# Patient Record
Sex: FEMALE | Race: BLACK OR AFRICAN AMERICAN | NOT HISPANIC OR LATINO | URBAN - METROPOLITAN AREA
[De-identification: names, ages, dates, MRNs, and addresses within clinical notes are randomized per-mention and may not be internally consistent; named-entity substitution may affect disease eponyms.]

---

## 2017-05-03 ENCOUNTER — EMERGENCY (EMERGENCY)
Facility: HOSPITAL | Age: 62
LOS: 1 days | Discharge: HOME | End: 2017-05-03
Attending: STUDENT IN AN ORGANIZED HEALTH CARE EDUCATION/TRAINING PROGRAM | Admitting: STUDENT IN AN ORGANIZED HEALTH CARE EDUCATION/TRAINING PROGRAM
Payer: COMMERCIAL

## 2017-05-03 VITALS
RESPIRATION RATE: 17 BRPM | OXYGEN SATURATION: 98 % | DIASTOLIC BLOOD PRESSURE: 57 MMHG | SYSTOLIC BLOOD PRESSURE: 105 MMHG | TEMPERATURE: 98 F

## 2017-05-03 VITALS
RESPIRATION RATE: 20 BRPM | HEART RATE: 75 BPM | WEIGHT: 162.04 LBS | TEMPERATURE: 98 F | HEIGHT: 62 IN | SYSTOLIC BLOOD PRESSURE: 135 MMHG | OXYGEN SATURATION: 100 % | DIASTOLIC BLOOD PRESSURE: 86 MMHG

## 2017-05-03 DIAGNOSIS — I10 ESSENTIAL (PRIMARY) HYPERTENSION: ICD-10-CM

## 2017-05-03 DIAGNOSIS — Z90.49 ACQUIRED ABSENCE OF OTHER SPECIFIED PARTS OF DIGESTIVE TRACT: Chronic | ICD-10-CM

## 2017-05-03 DIAGNOSIS — M54.9 DORSALGIA, UNSPECIFIED: ICD-10-CM

## 2017-05-03 DIAGNOSIS — E66.9 OBESITY, UNSPECIFIED: ICD-10-CM

## 2017-05-03 DIAGNOSIS — E78.5 HYPERLIPIDEMIA, UNSPECIFIED: ICD-10-CM

## 2017-05-03 DIAGNOSIS — R07.89 OTHER CHEST PAIN: ICD-10-CM

## 2017-05-03 PROCEDURE — 99285 EMERGENCY DEPT VISIT HI MDM: CPT

## 2017-05-03 PROCEDURE — 71046 X-RAY EXAM CHEST 2 VIEWS: CPT

## 2017-05-03 PROCEDURE — 96374 THER/PROPH/DIAG INJ IV PUSH: CPT

## 2017-05-03 PROCEDURE — 99284 EMERGENCY DEPT VISIT MOD MDM: CPT | Mod: 25

## 2017-05-03 PROCEDURE — 83690 ASSAY OF LIPASE: CPT

## 2017-05-03 PROCEDURE — 82150 ASSAY OF AMYLASE: CPT

## 2017-05-03 PROCEDURE — 85610 PROTHROMBIN TIME: CPT

## 2017-05-03 PROCEDURE — 84484 ASSAY OF TROPONIN QUANT: CPT

## 2017-05-03 PROCEDURE — 93005 ELECTROCARDIOGRAM TRACING: CPT

## 2017-05-03 PROCEDURE — 71020: CPT | Mod: 26

## 2017-05-03 PROCEDURE — 82553 CREATINE MB FRACTION: CPT

## 2017-05-03 PROCEDURE — 82550 ASSAY OF CK (CPK): CPT

## 2017-05-03 PROCEDURE — 85027 COMPLETE CBC AUTOMATED: CPT

## 2017-05-03 PROCEDURE — 80053 COMPREHEN METABOLIC PANEL: CPT

## 2017-05-03 RX ORDER — KETOROLAC TROMETHAMINE 30 MG/ML
30 SYRINGE (ML) INJECTION ONCE
Qty: 0 | Refills: 0 | Status: DISCONTINUED | OUTPATIENT
Start: 2017-05-03 | End: 2017-05-03

## 2017-05-03 RX ORDER — SIMVASTATIN 20 MG/1
1 TABLET, FILM COATED ORAL
Qty: 0 | Refills: 0 | COMMUNITY

## 2017-05-03 RX ORDER — ASPIRIN/CALCIUM CARB/MAGNESIUM 324 MG
325 TABLET ORAL ONCE
Qty: 0 | Refills: 0 | Status: COMPLETED | OUTPATIENT
Start: 2017-05-03 | End: 2017-05-03

## 2017-05-03 RX ORDER — LISINOPRIL 2.5 MG/1
1 TABLET ORAL
Qty: 0 | Refills: 0 | COMMUNITY

## 2017-05-03 RX ADMIN — Medication 325 MILLIGRAM(S): at 08:04

## 2017-05-03 RX ADMIN — Medication 30 MILLIGRAM(S): at 05:03

## 2017-05-03 RX ADMIN — Medication 30 MILLIGRAM(S): at 08:00

## 2017-05-03 NOTE — ED PROVIDER NOTE - PROGRESS NOTE DETAILS
Patient seen by Dr. Cardenas. No need to obtain second set CE. He has set patient up for outpatient echo and stress and recommends ASA 81mg daily. Patient expressed understanding, stable for discharge. Copies of all reports given

## 2017-05-03 NOTE — ED PROVIDER NOTE - OBJECTIVE STATEMENT
62 year old female with a 62 year old female with a history of HTN, high cholesterol presents with left-sided chest pain, back and b/l side pain since yesterday morning. Patient states muscular pain, worse with movement. 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 Patient took 2 Aleve and 3 Motrin without relief. No change with exertion. Denies n/v/SOB/diaphoresis. No history of DM, smoking, no family history of heart disease. Patient had a normal stress test 3 yers ago. PMD Analy Cabello.

## 2017-05-03 NOTE — ED ADULT NURSE NOTE - OBJECTIVE STATEMENT
c/o chest pain/back pain/ x today.  pain is reproducable with movement and breathing.  labs drawn and sent.

## 2017-05-03 NOTE — ED PROVIDER NOTE - NSCAREINITIATED _GEN_ER
Dr. Alonzo at bedside in PACU. Aware of increased BP. Will order metoprolol.   Suzi Diego(Attending)

## 2017-05-03 NOTE — CONSULT NOTE ADULT - SUBJECTIVE AND OBJECTIVE BOX
[facility] - Blossom Adkins, Brenda Galo Pannella, Patel    Initial Consult Note    CHIEF COMPLAINT: Patient is a 62y old  Female who presents with a chief complaint of chest pain    HPI:  This is a 62 year old woman with a history of obesity, hypertension, hyperlipidemia who presents to the ED with complaints of chest pain.  She reports that she was awoken by a left and right sided chest pain yesterday AM. There was no radiation, and no associated dyspnea, nausea, vomiting, or diaphoresis.  She went to work, but the pain persisted all day, and she came to the ED for evaluation.  She is pain free at the time of my exam.  This has not happened to her before. The pain was not associated with food, and was not exacerbated by exertion.  She denies PND, orthopnea, LE swelling, dizziness, or syncope.  She has never had a cardiac evaluation.      PAST MEDICAL & SURGICAL HISTORY:  High cholesterol  HTN (hypertension)  History of cholecystectomy      SOCIAL HISTORY:  No tobacco, ethanol, or drug abuse.    FAMILY HISTORY:    No family history of acute MI or sudden cardiac death.    MEDICATIONS  (STANDING):  Lisinopril and atorvastatin.  Unknown doses      Allergies    No Known Allergies        REVIEW OF SYSTEMS:    CONSTITUTIONAL: No weakness, fevers or chills  EYES/ENT: No visual changes;  No vertigo or throat pain   NECK: No pain or stiffness  RESPIRATORY: No cough, wheezing, hemoptysis; No shortness of breath  CARDIOVASCULAR: No chest pain or palpitations  GASTROINTESTINAL: No abdominal pain. No nausea, vomiting, or hematemesis; No diarrhea or constipation. No melena or hematochezia.  GENITOURINARY: No dysuria, frequency or hematuria  NEUROLOGICAL: No numbness or weakness  SKIN: No itching or rash  All other review of systems is negative unless indicated above    VITAL SIGNS:   Vital Signs Last 24 Hrs  T(C): 36.7, Max: 36.7 (05-03 @ 02:32)  T(F): 98.1, Max: 98.1 (05-03 @ 05:08)  HR: 63 (63 - 75)  BP: 121/54 (121/54 - 135/86)  BP(mean): --  RR: 18 (18 - 20)  SpO2: 99% (99% - 100%)    I&O's Summary      On Exam:    Constitutional: NAD, alert and oriented x 3  Lungs:  Non-labored, breath sounds are clear bilaterally, No wheezing, rales or rhonchi  Cardiovascular: RRR.  S1 and S2 positive.  No rubs, gallops or clicks.  2/6 systolic murmur  Gastrointestinal: Bowel Sounds present, soft, nontender.   Lymph: No peripheral edema. No cervical lymphadenopathy.  Neurological: Alert, no focal deficits  Skin: No rashes or ulcers   Psych:  Mood & affect appropriate.    LABS: All Labs Reviewed:                        12.1   8.4   )-----------( 265      ( 03 May 2017 03:12 )             36.4     03 May 2017 03:12    139    |  103    |  16     ----------------------------<  124    4.2     |  26     |  0.79     Ca    9.7        03 May 2017 03:12    TPro  8.0    /  Alb  4.2    /  TBili  0.3    /  DBili  x      /  AST  35     /  ALT  34     /  AlkPhos  92     03 May 2017 03:12    PT/INR - ( 03 May 2017 03:12 )   PT: 10.9 sec;   INR: 1.00 ratio           CARDIAC MARKERS ( 03 May 2017 03:12 )  <.015 ng/mL / x     / 193 U/L / x     / 1.2 ng/mL        RADIOLOGY:    [facility] - Blossom Adkins, Clover, Brenda, Mik Aguirre    Initial Consult Note    CHIEF COMPLAINT: Patient is a 62y old  Female who presents with a chief complaint of     HPI:      PAST MEDICAL & SURGICAL HISTORY:  High cholesterol  HTN (hypertension)  History of cholecystectomy      SOCIAL HISTORY:  No tobacco, ethanol, or drug abuse.    FAMILY HISTORY:    No family history of acute MI or sudden cardiac death.    MEDICATIONS  (STANDING):    MEDICATIONS  (PRN):      Allergies    No Known Allergies    Intolerances        REVIEW OF SYSTEMS:    CONSTITUTIONAL: No weakness, fevers or chills  EYES/ENT: No visual changes;  No vertigo or throat pain   NECK: No pain or stiffness  RESPIRATORY: No cough, wheezing, hemoptysis; No shortness of breath  CARDIOVASCULAR: No chest pain or palpitations  GASTROINTESTINAL: No abdominal pain. No nausea, vomiting, or hematemesis; No diarrhea or constipation. No melena or hematochezia.  GENITOURINARY: No dysuria, frequency or hematuria  NEUROLOGICAL: No numbness or weakness  SKIN: No itching or rash  All other review of systems is negative unless indicated above    VITAL SIGNS:   Vital Signs Last 24 Hrs  T(C): 36.7, Max: 36.7 (05-03 @ 02:32)  T(F): 98.1, Max: 98.1 (05-03 @ 05:08)  HR: 63 (63 - 75)  BP: 121/54 (121/54 - 135/86)  BP(mean): --  RR: 18 (18 - 20)  SpO2: 99% (99% - 100%)    I&O's Summary      On Exam:    Constitutional: NAD, alert and oriented x 3  Lungs:  Non-labored, breath sounds are clear bilaterally, No wheezing, rales or rhonchi  Cardiovascular: RRR.  S1 and S2 positive.  No murmurs, rubs, gallops or clicks  Gastrointestinal: Bowel Sounds present, soft, nontender.   Lymph: No peripheral edema. No cervical lymphadenopathy.  Neurological: Alert, no focal deficits  Skin: No rashes or ulcers   Psych:  Mood & affect appropriate.    LABS: All Labs Reviewed:                        12.1   8.4   )-----------( 265      ( 03 May 2017 03:12 )             36.4     03 May 2017 03:12    139    |  103    |  16     ----------------------------<  124    4.2     |  26     |  0.79     Ca    9.7        03 May 2017 03:12    TPro  8.0    /  Alb  4.2    /  TBili  0.3    /  DBili  x      /  AST  35     /  ALT  34     /  AlkPhos  92     03 May 2017 03:12    PT/INR - ( 03 May 2017 03:12 )   PT: 10.9 sec;   INR: 1.00 ratio           CARDIAC MARKERS ( 03 May 2017 03:12 )  <.015 ng/mL / x     / 193 U/L / x     / 1.2 ng/mL      Blood Culture:         RADIOLOGY:  CXR negative    EKG:  Sinus rhythm with anterolateral TWI    Assessment/Plan:  62y Female with above history with atypical chest pain.  Her EKG has T wave inversions consistent with lateral ischemia.  I do not think she is having an acute coronary syndrome.  She can be discharged from the ED, and will be started on aspirin 81 QD.  She was given the contact information for my office to make an appointment for an echocardiogram to assess her cardiac structure and function, as well as a nuclear stress test to assess for ischemic heart disease.  She knows to call the office today for an appt.  She will return to the ED if the symptoms worsen.  She will continue her ACEi and statin at her outpatient doses.           EKG:    Assessment/Plan:  62y Female

## 2017-05-03 NOTE — ED ADULT NURSE NOTE - ED STAT RN HANDOFF DETAILS
Report received from Milan EVANS pt status and medications reviewed awaiting repeat cardiac enzymes and cardiology consult.

## 2020-02-10 NOTE — ED ADULT TRIAGE NOTE - NS ED NURSE DIRECT TO ROOM YN
Still requiring oxygen at 4 L.  Will wean off to keep sats above 92% if tolerated.  We will have pulmonology evaluate the patient.   No

## 2023-03-08 PROBLEM — E78.00 PURE HYPERCHOLESTEROLEMIA, UNSPECIFIED: Chronic | Status: ACTIVE | Noted: 2017-05-03

## 2023-03-08 PROBLEM — I10 ESSENTIAL (PRIMARY) HYPERTENSION: Chronic | Status: ACTIVE | Noted: 2017-05-03

## 2023-03-09 PROBLEM — Z00.00 ENCOUNTER FOR PREVENTIVE HEALTH EXAMINATION: Status: ACTIVE | Noted: 2023-03-09

## 2023-03-13 ENCOUNTER — NON-APPOINTMENT (OUTPATIENT)
Age: 68
End: 2023-03-13

## 2023-03-13 ENCOUNTER — APPOINTMENT (OUTPATIENT)
Dept: ORTHOPEDIC SURGERY | Facility: CLINIC | Age: 68
End: 2023-03-13
Payer: OTHER MISCELLANEOUS

## 2023-03-13 ENCOUNTER — FORM ENCOUNTER (OUTPATIENT)
Age: 68
End: 2023-03-13

## 2023-03-13 VITALS — BODY MASS INDEX: 30.36 KG/M2 | HEIGHT: 62 IN | WEIGHT: 165 LBS

## 2023-03-13 DIAGNOSIS — M79.18 MYALGIA, OTHER SITE: ICD-10-CM

## 2023-03-13 DIAGNOSIS — E78.00 PURE HYPERCHOLESTEROLEMIA, UNSPECIFIED: ICD-10-CM

## 2023-03-13 DIAGNOSIS — I10 ESSENTIAL (PRIMARY) HYPERTENSION: ICD-10-CM

## 2023-03-13 PROCEDURE — 99072 ADDL SUPL MATRL&STAF TM PHE: CPT

## 2023-03-13 PROCEDURE — 73010 X-RAY EXAM OF SHOULDER BLADE: CPT | Mod: LT

## 2023-03-13 PROCEDURE — 73030 X-RAY EXAM OF SHOULDER: CPT | Mod: LT

## 2023-03-13 PROCEDURE — 99204 OFFICE O/P NEW MOD 45 MIN: CPT

## 2023-03-13 RX ORDER — NAPROXEN 500 MG/1
500 TABLET ORAL TWICE DAILY
Qty: 60 | Refills: 0 | Status: ACTIVE | COMMUNITY
Start: 2023-03-13 | End: 1900-01-01

## 2023-03-13 NOTE — WORK
[Partial] : partial [Does not reveal pre-existing condition(s) that may affect treatment/prognosis] : does not reveal pre-existing condition(s) that may affect treatment/prognosis [Can return to work with limitations on ______] : can return to work with limitations on [unfilled] [Patient] : patient [I provided the services listed above] :  I provided the services listed above.

## 2023-03-13 NOTE — HISTORY OF PRESENT ILLNESS
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

## 2023-03-13 NOTE — ASSESSMENT
[FreeTextEntry1] :  Left X-Ray Examination of the SHOULDER 2 views:  quest GT avulsion\par X-Ray Examination of the SCAPULA 1 or 2 views shows: there is an acromial spur\par \par given their traumatic injury along with weakness on exam and positive nathan testing, we will obtain an MRI to evaluate the integrity of the rotator cuff tissue and possible need for surgery\par \par - The patient was advised of the diagnosis.  The natural history of the pathology was explained to the patient in layman's terms.  Several different treatment options were discussed and explained including the risks and benefits of both surgical and non-surgical treatments. \par - Follow up after MRI to discuss results and further discuss treatment options.\par - Patient was given a prescription for an anti-inflammatory medication.  They will take it for the next week and then on an as needed basis, as long as there are no medical contra-indications.  Patient is counseled on possible GI, renal, and cardiovascular side effects.\par - Napryn rx\par - In the meantime, the patient was advised to apply ice to the area daily, use anti-inflammatory medication, and let pain guide their activities.\par

## 2023-03-13 NOTE — IMAGING
[de-identified] : \par LEFT SHOULDER\par Inspection: No swelling. \par Palpation: Tenderness is noted at the bicipital groove, anterior and lateral. \par Range of motion: There is pain with range of motion.\par , ER 55, @90ER 90, @90IR 30\par Strength: There is pain, weakness, and discomfort with strength testing.\par Forward Flexion 3/5. Abduction 3/5.  External Rotation 4/5 and Internal Rotation 5-/5 \par Neurological testings: motor and sensor intact distally.\par Ligament Stability and Special Tests: \par There is positive arc of pain. \par Shoulder apprehension: neg\par Shoulder relocation: neg\par Freeman’s test: pos\par Biceps Active test: neg\par Jiménez Labral Shear: neg\par Impingement testing: pos\par Rogelio testing: pos\par Whipple: pos\par Cross Body Adduction: neg\par

## 2023-03-14 ENCOUNTER — APPOINTMENT (OUTPATIENT)
Dept: MRI IMAGING | Facility: CLINIC | Age: 68
End: 2023-03-14
Payer: OTHER MISCELLANEOUS

## 2023-03-14 PROCEDURE — 73221 MRI JOINT UPR EXTREM W/O DYE: CPT | Mod: LT

## 2023-03-28 ENCOUNTER — TRANSCRIPTION ENCOUNTER (OUTPATIENT)
Age: 68
End: 2023-03-28

## 2023-03-30 PROBLEM — M65.812 SYNOVITIS OF LEFT SHOULDER: Status: ACTIVE | Noted: 2023-03-30

## 2023-03-30 PROBLEM — S46.102A INJURY OF TENDON OF LONG HEAD OF BICEPS, LEFT, INITIAL ENCOUNTER: Status: ACTIVE | Noted: 2023-03-30

## 2023-03-30 PROBLEM — S43.432A TEAR OF LEFT GLENOID LABRUM, INITIAL ENCOUNTER: Status: ACTIVE | Noted: 2023-03-30

## 2023-03-30 NOTE — DATA REVIEWED
[MRI] : MRI [Left] : left [Shoulder] : shoulder [I independently reviewed and interpreted images and report] : I independently reviewed and interpreted images and report

## 2023-04-03 ENCOUNTER — APPOINTMENT (OUTPATIENT)
Dept: ORTHOPEDIC SURGERY | Facility: CLINIC | Age: 68
End: 2023-04-03
Payer: OTHER MISCELLANEOUS

## 2023-04-03 VITALS — HEIGHT: 62 IN | WEIGHT: 165 LBS | BODY MASS INDEX: 30.36 KG/M2

## 2023-04-03 DIAGNOSIS — S43.432A SUPERIOR GLENOID LABRUM LESION OF LEFT SHOULDER, INITIAL ENCOUNTER: ICD-10-CM

## 2023-04-03 DIAGNOSIS — S46.102A UNSPECIFIED INJURY OF MUSCLE, FASCIA AND TENDON OF LONG HEAD OF BICEPS, LEFT ARM, INITIAL ENCOUNTER: ICD-10-CM

## 2023-04-03 DIAGNOSIS — M65.812 OTHER SYNOVITIS AND TENOSYNOVITIS, LEFT SHOULDER: ICD-10-CM

## 2023-04-03 PROCEDURE — 99214 OFFICE O/P EST MOD 30 MIN: CPT

## 2023-04-03 NOTE — DISCUSSION/SUMMARY
[de-identified] : The patient was advised of the diagnosis. The natural history of the pathology was explained in full to the patient in layman's terms. Several different treatment options were discussed and explained to the patient including the risks and benefits of both surgical and non-surgical treatments. \par \par I do think they are a candidate for surgery, given their pain, weakness, and acute / traumatic nature of their tear. We discussed that the goal of surgery is pain relief along with improved function.  We also discussed that early repair in acute tears has improved functional outcomes and helps mitigate the development of chronic tendon and muscle pathology such as retraction, fatty infiltration, and atrophy.\par \par The risks, benefits, and alternatives to surgical arthroscopy of the left shoulder with rotator cuff repair, subacromial decompression, glenohumeral joint debridement, possible distal clavicle excision,synovectomy, and possible biceps tenotomy vs. tenodesis were reviewed with the patient. Risks of surgery were outlined in full to the patient including but not limited to pain, infection (superficial or deep), bleeding, vascular injury, numbness, tingling, nerve damage (direct or indirect), scarring, stiffness  (including the possible development of post op adhesive capsulitis), non-healing, wound breakdown, failure to resolve symptoms, symptom recurrence, the need for further surgery, as well as medical complications such as blood clots, pulmonary embolism, heart attack, stroke, and other anesthesia complications including even death. The patient understood and accepted these risks and that other complications not mentioned above could occur. \par \par They understand that 100% recovery is not assured and the desired level of function may not be achievable. We discussed the potential for a prolonged recovery course and the potential for this to affect their activities.\par \par The intraoperative plan, post-operative plan, post-operative expectations and limitations were explained in full. The importance of postoperative rehabilitation and restriction compliance was emphasized. Expectations from non-surgical treatment were explained in full as well. The patient demonstrated a complete understanding of the treatment alternatives and requested to proceed with surgery. This will be scheduled accordingly.\par \par

## 2023-04-03 NOTE — IMAGING
[de-identified] : \par LEFT SHOULDER\par Inspection: No swelling. \par Palpation: Tenderness is noted at the bicipital groove, anterior and lateral. \par Range of motion: There is pain with range of motion.\par , ER 55, @90ER 90, @90IR 30\par Strength: There is pain, weakness, and discomfort with strength testing.\par Forward Flexion 3/5. Abduction 3/5.  External Rotation 4/5 and Internal Rotation 5-/5 \par Neurological testings: motor and sensor intact distally.\par Ligament Stability and Special Tests: \par There is positive arc of pain. \par Shoulder apprehension: neg\par Shoulder relocation: neg\par Freeman’s test: pos\par Biceps Active test: neg\par Jiménez Labral Shear: neg\par Impingement testing: pos\par Rogelio testing: pos\par Whipple: pos\par Cross Body Adduction: pos\par

## 2023-04-03 NOTE — HISTORY OF PRESENT ILLNESS
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

## 2023-04-03 NOTE — ASSESSMENT
[FreeTextEntry1] : mri left shoulder 3/14/23 - full thickness tear supra with 1.5 cm retraction, partial tear infra and subscap, labral tearing, bursiits, bicep tendonitis, synov, ac deg\par \par \par - We discussed their diagnosis and treatment options at length including the risks and benefits of both surgical and non-surgical options.\par - In addition to discussing non-operative treatment options, we discussed that early repair in acute tears has improved functional outcomes and helps mitigate the development of chronic tendon and muscle pathology such as retraction, fatty infiltration, and atrophy.\par - Given their symptoms of pain and weakness along with the acute / traumatic nature of their tear, they are a surgical candidate. \par - The risks, benefits, and alternatives to left shoulder surgical arthroscopy with rotator cuff repair, SAD, GHD, synovectomy, poss DCE, poss biceps tenotomy vs. tenodesis were discussed with the patient, all questions were answered.\par  impaired

## 2023-05-16 ENCOUNTER — FORM ENCOUNTER (OUTPATIENT)
Age: 68
End: 2023-05-16

## 2023-05-31 ENCOUNTER — NON-APPOINTMENT (OUTPATIENT)
Age: 68
End: 2023-05-31

## 2023-06-02 ENCOUNTER — APPOINTMENT (OUTPATIENT)
Dept: ORTHOPEDIC SURGERY | Facility: CLINIC | Age: 68
End: 2023-06-02

## 2023-06-09 ENCOUNTER — APPOINTMENT (OUTPATIENT)
Age: 68
End: 2023-06-09
Payer: OTHER MISCELLANEOUS

## 2023-06-09 DIAGNOSIS — Z98.890 OTHER SPECIFIED POSTPROCEDURAL STATES: ICD-10-CM

## 2023-06-09 PROCEDURE — 29824 SHO ARTHRS SRG DSTL CLAVICLC: CPT | Mod: 59,LT

## 2023-06-09 PROCEDURE — 29826 SHO ARTHRS SRG DECOMPRESSION: CPT | Mod: AS,LT

## 2023-06-09 PROCEDURE — 29820 SHO ARTHRS SRG PRTL SYNVCT: CPT | Mod: AS,59,LT

## 2023-06-09 PROCEDURE — 29827 SHO ARTHRS SRG RT8TR CUF RPR: CPT | Mod: AS,LT

## 2023-06-09 PROCEDURE — 29824 SHO ARTHRS SRG DSTL CLAVICLC: CPT | Mod: AS,59,LT

## 2023-06-09 PROCEDURE — 29820 SHO ARTHRS SRG PRTL SYNVCT: CPT | Mod: 59,LT

## 2023-06-09 PROCEDURE — 23405 INCISION OF TENDON & MUSCLE: CPT | Mod: 59,LT

## 2023-06-09 PROCEDURE — 29826 SHO ARTHRS SRG DECOMPRESSION: CPT | Mod: LT

## 2023-06-09 PROCEDURE — 29823 SHO ARTHRS SRG XTNSV DBRDMT: CPT | Mod: 59,LT

## 2023-06-09 PROCEDURE — 29823 SHO ARTHRS SRG XTNSV DBRDMT: CPT | Mod: AS,59,LT

## 2023-06-09 PROCEDURE — 29827 SHO ARTHRS SRG RT8TR CUF RPR: CPT | Mod: LT

## 2023-06-09 PROCEDURE — 23405 INCISION OF TENDON & MUSCLE: CPT | Mod: AS,59,LT

## 2023-06-09 RX ORDER — IBUPROFEN 600 MG/1
600 TABLET, FILM COATED ORAL EVERY 6 HOURS
Qty: 20 | Refills: 0 | Status: COMPLETED | COMMUNITY
Start: 2023-06-09 | End: 2023-06-14

## 2023-06-09 RX ORDER — PROMETHAZINE HYDROCHLORIDE 12.5 MG/1
12.5 TABLET ORAL EVERY 6 HOURS
Qty: 20 | Refills: 0 | Status: COMPLETED | COMMUNITY
Start: 2023-06-09 | End: 2023-06-14

## 2023-06-09 RX ORDER — OXYCODONE 5 MG/1
5 TABLET ORAL
Qty: 16 | Refills: 0 | Status: COMPLETED | COMMUNITY
Start: 2023-06-09 | End: 2023-06-11

## 2023-06-09 RX ORDER — ACETAMINOPHEN 500 MG/1
500 TABLET ORAL 3 TIMES DAILY
Qty: 90 | Refills: 0 | Status: COMPLETED | COMMUNITY
Start: 2023-06-09 | End: 2023-06-24

## 2023-06-15 NOTE — WORK
[Total (100%)] : total (100%) [Does not reveal pre-existing condition(s) that may affect treatment/prognosis] : does not reveal pre-existing condition(s) that may affect treatment/prognosis [Cannot return to work because ________] : cannot return to work because [unfilled] [Patient] : patient [I provided the services listed above] :  I provided the services listed above.

## 2023-06-19 ENCOUNTER — APPOINTMENT (OUTPATIENT)
Dept: ORTHOPEDIC SURGERY | Facility: CLINIC | Age: 68
End: 2023-06-19
Payer: COMMERCIAL

## 2023-06-19 PROCEDURE — 99024 POSTOP FOLLOW-UP VISIT: CPT

## 2023-06-19 NOTE — IMAGING
[de-identified] : \par LEFT SHOULDER\par Inspection: incisions c/d/i\par Palpation: No Tenderness is noted \par Range of motion:  in sling\par Strength:  \par Neurological testing: motor and sensor intact distally.\par Ligament Stability and Special Tests: \par Shoulder apprehension: \par Shoulder relocation: \par Freeman’s test:\par Biceps Active test:\par Jiménez Labral Shear: \par Impingement testing: \par Rogelio testing:\par Cross Body Adduction:\par \par

## 2023-06-19 NOTE — ASSESSMENT
[FreeTextEntry1] : s/p L shoulder RCR, SAD, GHD, synov, DCE, BicTT on 6/9/23\par \par - PT on post op protocol\par - The patient was provided with a prescription for Physical Therapy \par - Home exercises program learned at physical therapy.\par - The patient was advised to apply ice (wrapped in a towel or protective covering) to the area daily (20 minutes at a time, 2-4X/day).\par - fu 8 week re-eval\par

## 2023-06-19 NOTE — HISTORY OF PRESENT ILLNESS
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

## 2023-08-21 ENCOUNTER — APPOINTMENT (OUTPATIENT)
Dept: ORTHOPEDIC SURGERY | Facility: CLINIC | Age: 68
End: 2023-08-21
Payer: SELF-PAY

## 2023-08-21 PROCEDURE — 99024 POSTOP FOLLOW-UP VISIT: CPT

## 2023-08-21 NOTE — IMAGING
[de-identified] :  LEFT SHOULDER Inspection: well healed surg scars Palpation: No Tenderness is noted  Range of motion:  range of motion limited secondary to immobilization Strength:  strength is improving Neurological testing: motor and sensor intact distally. Ligament Stability and Special Tests:  Shoulder apprehension: neg Shoulder relocation: neg Obriens test: Biceps Active test: Jiménez Labral Shear:  Impingement testing:  Rogelio testing: Cross Body Adduction:

## 2023-08-21 NOTE — ASSESSMENT
[FreeTextEntry1] : s/p L shoulder RCR, SAD, GHD, synov, DCE, BicTT on 6/9/23    - The patient was advised of the diagnosis.  The natural history of the pathology was explained to the patient in layman's terms.  Several different treatment options were discussed and explained including the risks and benefits of both surgical and non-surgical treatments.  All questions and concerns were answered. - We will continue conservative treatment with PT, icing, and anti-inflammatory medications. - cont PT on post op protocol - The patient was provided with a prescription for Physical Therapy  - Home exercises program learned at physical therapy. - The patient was advised to apply ice (wrapped in a towel or protective covering) to the area daily (20 minutes at a time, 2-4X/day). - fu 8 week re-eval

## 2023-08-21 NOTE — HISTORY OF PRESENT ILLNESS
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 Has tried ice, Tylenol, Motrin  4/3/23 - mod activiyt, using nsaids, cont weakness, had mri, still pain and weakness  ***s/p L shoulder RCR, SAD, GHD, synov, DCE, BicTT on 6/9/23***  6/19/23 - po visit, using sling, pain controlled 8/21/23 - doing PT on protocol at [facility] in

## 2023-10-16 ENCOUNTER — APPOINTMENT (OUTPATIENT)
Dept: ORTHOPEDIC SURGERY | Facility: CLINIC | Age: 68
End: 2023-10-16
Payer: OTHER MISCELLANEOUS

## 2023-10-16 PROCEDURE — 99213 OFFICE O/P EST LOW 20 MIN: CPT

## 2023-10-30 ENCOUNTER — OFFICE (OUTPATIENT)
Dept: URBAN - METROPOLITAN AREA CLINIC 35 | Facility: CLINIC | Age: 68
Setting detail: OPHTHALMOLOGY
End: 2023-10-30
Payer: COMMERCIAL

## 2023-10-30 ENCOUNTER — RX ONLY (RX ONLY)
Age: 68
End: 2023-10-30

## 2023-10-30 DIAGNOSIS — H10.45: ICD-10-CM

## 2023-10-30 DIAGNOSIS — H11.153: ICD-10-CM

## 2023-10-30 DIAGNOSIS — H16.223: ICD-10-CM

## 2023-10-30 DIAGNOSIS — H25.13: ICD-10-CM

## 2023-10-30 DIAGNOSIS — E11.9: ICD-10-CM

## 2023-10-30 DIAGNOSIS — H02.34: ICD-10-CM

## 2023-10-30 DIAGNOSIS — H02.31: ICD-10-CM

## 2023-10-30 PROCEDURE — 92014 COMPRE OPH EXAM EST PT 1/>: CPT | Performed by: OPHTHALMOLOGY

## 2023-10-30 PROCEDURE — 92250 FUNDUS PHOTOGRAPHY W/I&R: CPT | Performed by: OPHTHALMOLOGY

## 2023-10-30 ASSESSMENT — REFRACTION_CURRENTRX
OS_CYLINDER: +0.75
OD_OVR_VA: 20/
OS_ADD: +3.00
OS_AXIS: 003
OS_VPRISM_DIRECTION: PROGS
OS_OVR_VA: 20/
OD_VPRISM_DIRECTION: PROGS
OD_AXIS: 022
OD_SPHERE: +0.50
OD_ADD: +3.00
OS_SPHERE: +0.50
OD_CYLINDER: +1.00

## 2023-10-30 ASSESSMENT — REFRACTION_MANIFEST
OD_SPHERE: PLANO
OS_AXIS: 180
OD_ADD: +3.25
OD_AXIS: 180
OD_SPHERE: PLANO
OS_ADD: +3.00
OD_CYLINDER: +1.00
OD_ADD: +3.00
OS_VA1: 20/25+
OD_AXIS: 180
OS_SPHERE: PLANO
OS_ADD: +3.25
OS_CYLINDER: +1.25
OS_AXIS: 180
OS_CYLINDER: +0.75
OD_CYLINDER: +1.25
OD_VA1: 20/25
OS_SPHERE: +0.50

## 2023-10-30 ASSESSMENT — VISUAL ACUITY
OS_BCVA: 20/20-2
OD_BCVA: 20/20-2

## 2023-10-30 ASSESSMENT — KERATOMETRY
OD_K1POWER_DIOPTERS: 43.25
OS_AXISANGLE_DEGREES: 002
OS_K1POWER_DIOPTERS: 43.00
OD_AXISANGLE_DEGREES: 171
OD_K2POWER_DIOPTERS: 44.00
OS_K2POWER_DIOPTERS: 43.25

## 2023-10-30 ASSESSMENT — REFRACTION_AUTOREFRACTION
OD_SPHERE: PLANO
OS_CYLINDER: +1.25
OD_AXIS: 177
OD_CYLINDER: +1.50
OS_AXIS: 174
OS_SPHERE: +0.25

## 2023-10-30 ASSESSMENT — LID EXAM ASSESSMENTS
OD_COMMENTS: BLEPHAROCHALASIS
OS_COMMENTS: BLEPHAROCHALASIS

## 2023-10-30 ASSESSMENT — AXIALLENGTH_DERIVED
OS_AL: 23.3905
OS_AL: 23.3905

## 2023-10-30 ASSESSMENT — CONFRONTATIONAL VISUAL FIELD TEST (CVF)
OS_FINDINGS: FULL
OD_FINDINGS: FULL

## 2023-10-30 ASSESSMENT — SUPERFICIAL PUNCTATE KERATITIS (SPK): OS_SPK: 1+

## 2023-10-30 ASSESSMENT — DRY EYES - PHYSICIAN NOTES: OS_GENERALCOMMENTS: SPK INF

## 2023-10-30 ASSESSMENT — SPHEQUIV_DERIVED
OS_SPHEQUIV: 0.875
OS_SPHEQUIV: 0.875

## 2023-10-30 ASSESSMENT — TONOMETRY
OD_IOP_MMHG: 17
OS_IOP_MMHG: 17

## 2023-12-11 ENCOUNTER — APPOINTMENT (OUTPATIENT)
Dept: ORTHOPEDIC SURGERY | Facility: CLINIC | Age: 68
End: 2023-12-11
Payer: OTHER MISCELLANEOUS

## 2023-12-11 PROCEDURE — 99213 OFFICE O/P EST LOW 20 MIN: CPT

## 2024-01-22 ENCOUNTER — APPOINTMENT (OUTPATIENT)
Dept: ORTHOPEDIC SURGERY | Facility: CLINIC | Age: 69
End: 2024-01-22
Payer: OTHER MISCELLANEOUS

## 2024-01-22 PROCEDURE — 99213 OFFICE O/P EST LOW 20 MIN: CPT

## 2024-01-22 NOTE — IMAGING
[de-identified] :  LEFT SHOULDER Inspection: well healed surg scars Palpation: No Tenderness is noted  Range of motion:  , ER 55, @90ER 70, @90IR 30 Strength: Forward Flexion 5-/5. Abduction 5-/5.  External Rotation 5-/5 and Internal Rotation 5/5 Neurological testing: motor and sensor intact distally. Ligament Stability and Special Tests:  Shoulder apprehension: neg Shoulder relocation: neg Obriens test: neg Biceps Active test: neg Jiménez Labral Shear: neg Impingement testing: neg Rogelio testing: neg Cross Body Adduction: neg

## 2024-01-22 NOTE — ASSESSMENT
[FreeTextEntry1] : s/p L shoulder RCR, SAD, GHD, synov, DCE, BicTT on 6/9/23    - The patient was advised of the diagnosis.  The natural history of the pathology was explained to the patient in layman's terms.  Several different treatment options were discussed and explained including the risks and benefits of both surgical and non-surgical treatments.  All questions and concerns were answered. - We will continue conservative treatment with PT, icing, and anti-inflammatory medications. - The patient was provided with a prescription for Physical Therapy. - The patient is to continue home exercises learned at physical therapy. - The patient was advised to let pain guide the gradual advancement of activities. - can RTW feb 12th - fu 6-8 week re-eval

## 2024-01-22 NOTE — HISTORY OF PRESENT ILLNESS
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 Has tried ice, Tylenol, Motrin  4/3/23 - mod activiyt, using nsaids, cont weakness, had mri, still pain and weakness  ***s/p L shoulder RCR, SAD, GHD, synov, DCE, BicTT on 6/9/23***  6/19/23 - po visit, using sling, pain controlled 8/21/23 - doing PT on protocol at [facility] in  10/16/23 - cont with PT and HEP, still some stifness and weakness 12/11/23 - doing PT with improvement  1/22/24- L shoulder improving, going to PT and doing HEP but still some weakness

## 2024-01-22 NOTE — REASON FOR VISIT
[FreeTextEntry2] : left shoulder  Spine appears normal, range of motion is not limited, no muscle or joint tenderness

## 2024-02-29 ENCOUNTER — APPOINTMENT (OUTPATIENT)
Dept: ORTHOPEDIC SURGERY | Facility: CLINIC | Age: 69
End: 2024-02-29
Payer: OTHER MISCELLANEOUS

## 2024-02-29 PROCEDURE — 20611 DRAIN/INJ JOINT/BURSA W/US: CPT | Mod: LT

## 2024-02-29 PROCEDURE — 99214 OFFICE O/P EST MOD 30 MIN: CPT | Mod: 25

## 2024-02-29 PROCEDURE — J3490M: CUSTOM

## 2024-02-29 NOTE — ASSESSMENT
[FreeTextEntry1] : s/p L shoulder RCR, SAD, GHD, synov, DCE, BicTT on 6/9/23    - The patient was advised of the diagnosis.  The natural history of the pathology was explained to the patient in layman's terms.  Several different treatment options were discussed and explained including the risks and benefits of both surgical and non-surgical treatments.  All questions and concerns were answered. - We will continue conservative treatment with PT, icing, and anti-inflammatory medications. - The patient was provided with a prescription for Physical Therapy. - The patient is to continue home exercises learned at physical therapy. - The patient was advised to let pain guide the gradual advancement of activities.  - We also discussed the possible of a corticosteroid injection in order to help decrease inflammation and pain so that they can perform better therapy and they wished to proceed with this treatment course. - can work but no heavy lifting at work  - fu 8 week re-eval

## 2024-02-29 NOTE — WORK
[Partial] : partial [Does not reveal pre-existing condition(s) that may affect treatment/prognosis] : does not reveal pre-existing condition(s) that may affect treatment/prognosis [Patient] : patient [I provided the services listed above] :  I provided the services listed above. [Can return to work with limitations on ______] : can return to work with limitations on [unfilled]

## 2024-02-29 NOTE — IMAGING
[de-identified] :  LEFT SHOULDER Inspection: well healed surg scars Palpation: No Tenderness is noted  Range of motion:  , ER 55, @90ER 70, @90IR 30 Strength: Forward Flexion 5-/5. Abduction 5-/5.  External Rotation 5-/5 and Internal Rotation 5/5 Neurological testing: motor and sensor intact distally. Ligament Stability and Special Tests:  Shoulder apprehension: neg Shoulder relocation: neg Obriens test: neg Biceps Active test: neg Jiménez Labral Shear: neg Impingement testing: neg Rogelio testing: neg Cross Body Adduction: neg

## 2024-02-29 NOTE — HISTORY OF PRESENT ILLNESS
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

## 2024-04-22 ENCOUNTER — APPOINTMENT (OUTPATIENT)
Dept: ORTHOPEDIC SURGERY | Facility: CLINIC | Age: 69
End: 2024-04-22
Payer: OTHER MISCELLANEOUS

## 2024-04-22 VITALS — WEIGHT: 170 LBS | BODY MASS INDEX: 31.28 KG/M2 | HEIGHT: 62 IN

## 2024-04-22 DIAGNOSIS — M75.52 BURSITIS OF LEFT SHOULDER: ICD-10-CM

## 2024-04-22 PROCEDURE — 99214 OFFICE O/P EST MOD 30 MIN: CPT

## 2024-04-22 RX ORDER — NAPROXEN 500 MG/1
500 TABLET ORAL TWICE DAILY
Qty: 60 | Refills: 0 | Status: ACTIVE | COMMUNITY
Start: 2024-04-22 | End: 1900-01-01

## 2024-04-22 NOTE — IMAGING
[de-identified] :  LEFT SHOULDER Inspection: well healed surg scars Palpation: No Tenderness is noted  Range of motion:  , ER 55, @90ER 70, @90IR 30 Strength: Forward Flexion 5-/5. Abduction 5-/5.  External Rotation 5-/5 and Internal Rotation 5/5 Neurological testing: motor and sensor intact distally. Ligament Stability and Special Tests:  Shoulder apprehension: neg Shoulder relocation: neg Obriens test: neg Biceps Active test: neg Jiménez Labral Shear: neg Impingement testing: neg Rogelio testing: neg Cross Body Adduction: neg

## 2024-04-22 NOTE — HISTORY OF PRESENT ILLNESS
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

## 2024-04-22 NOTE — ASSESSMENT
[FreeTextEntry1] : s/p L shoulder RCR, SAD, GHD, synov, DCE, BicTT on 6/9/23    - The patient was advised of the diagnosis.  The natural history of the pathology was explained to the patient in layman's terms.  Several different treatment options were discussed and explained including the risks and benefits of both surgical and non-surgical treatments.  All questions and concerns were answered. - We will continue conservative treatment with PT, icing, and anti-inflammatory medications. - The patient was provided with a prescription for Physical Therapy. - The patient is to continue home exercises learned at physical therapy. - The patient was advised to let pain guide the gradual advancement of activities.  - naprosyn rx - Patient was given a prescription for an anti-inflammatory medication.  They will take it for the next week and then on an as needed basis, as long as there are no medical contra-indications.  Patient is counseled on possible GI, renal, and cardiovascular side effects. - can work but no heavy lifting at work  - fu 8 week re-eval

## 2024-06-20 ENCOUNTER — APPOINTMENT (OUTPATIENT)
Dept: ORTHOPEDIC SURGERY | Facility: CLINIC | Age: 69
End: 2024-06-20
Payer: OTHER MISCELLANEOUS

## 2024-06-20 DIAGNOSIS — S46.012D STRAIN OF MUSCLE(S) AND TENDON(S) OF THE ROTATOR CUFF OF LEFT SHOULDER, SUBSEQUENT ENCOUNTER: ICD-10-CM

## 2024-06-20 PROCEDURE — 99213 OFFICE O/P EST LOW 20 MIN: CPT

## 2024-06-20 NOTE — ASSESSMENT
[FreeTextEntry1] : s/p L shoulder RCR, SAD, GHD, synov, DCE, BicTT on 6/9/23    - The patient was advised of the diagnosis.  The natural history of the pathology was explained to the patient in layman's terms.  Several different treatment options were discussed and explained including the risks and benefits of both surgical and non-surgical treatments.  All questions and concerns were answered. - We will continue conservative treatment with PT, icing, and anti-inflammatory medications. - The patient was provided with a prescription for Physical Therapy. - The patient is to continue home exercises learned at physical therapy. - The patient was advised to let pain guide the gradual advancement of activities.  - can work but no heavy lifting at work  - fu 8 week re-eval

## 2024-06-20 NOTE — IMAGING
[de-identified] :  LEFT SHOULDER Inspection: well healed surg scars Palpation: No Tenderness is noted  Range of motion:  , ER 55, @90ER 70, @90IR 30 Strength: Forward Flexion 5-/5. Abduction 5-/5.  External Rotation 5-/5 and Internal Rotation 5/5 Neurological testing: motor and sensor intact distally. Ligament Stability and Special Tests:  Shoulder apprehension: neg Shoulder relocation: neg Obriens test: neg Biceps Active test: neg Jiménez Labral Shear: neg Impingement testing: neg Rogelio testing: neg Cross Body Adduction: neg

## 2024-06-20 NOTE — HISTORY OF PRESENT ILLNESS
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

## 2024-08-15 ENCOUNTER — APPOINTMENT (OUTPATIENT)
Dept: ORTHOPEDIC SURGERY | Facility: CLINIC | Age: 69
End: 2024-08-15
Payer: OTHER MISCELLANEOUS

## 2024-08-15 VITALS — HEIGHT: 62 IN | BODY MASS INDEX: 31.28 KG/M2 | WEIGHT: 170 LBS

## 2024-08-15 DIAGNOSIS — S46.012D STRAIN OF MUSCLE(S) AND TENDON(S) OF THE ROTATOR CUFF OF LEFT SHOULDER, SUBSEQUENT ENCOUNTER: ICD-10-CM

## 2024-08-15 DIAGNOSIS — M75.52 BURSITIS OF LEFT SHOULDER: ICD-10-CM

## 2024-08-15 PROCEDURE — 99214 OFFICE O/P EST MOD 30 MIN: CPT

## 2024-08-15 RX ORDER — MELOXICAM 15 MG/1
15 TABLET ORAL
Qty: 30 | Refills: 0 | Status: ACTIVE | COMMUNITY
Start: 2024-08-15 | End: 1900-01-01

## 2024-08-15 NOTE — IMAGING
[de-identified] :  LEFT SHOULDER Inspection: well healed surg scars Palpation: No Tenderness is noted  Range of motion:  , ER 55, @90ER 70, @90IR 30 Strength: Forward Flexion 5-/5. Abduction 5-/5.  External Rotation 5-/5 and Internal Rotation 5/5 Neurological testing: motor and sensor intact distally. Ligament Stability and Special Tests:  Shoulder apprehension: neg Shoulder relocation: neg Obriens test: neg Biceps Active test: neg Jiménez Labral Shear: neg Impingement testing: neg Rogelio testing: neg Cross Body Adduction: neg

## 2024-08-15 NOTE — HISTORY OF PRESENT ILLNESS
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

## 2024-08-15 NOTE — ASSESSMENT
[FreeTextEntry1] : s/p L shoulder RCR, SAD, GHD, synov, DCE, BicTT on 6/9/23    - The patient was advised of the diagnosis.  The natural history of the pathology was explained to the patient in layman's terms.  Several different treatment options were discussed and explained including the risks and benefits of both surgical and non-surgical treatments.  All questions and concerns were answered. - We will continue conservative treatment with PT, icing, and anti-inflammatory medications. - The patient was provided with a prescription for Physical Therapy. - The patient is to continue home exercises learned at physical therapy. - The patient was advised to let pain guide the gradual advancement of activities.  - Mobic rx - Patient was given a prescription for an anti-inflammatory medication.  They will take it for the next week and then on an as needed basis, as long as there are no medical contra-indications.  Patient is counseled on possible GI, renal, and cardiovascular side effects. - can work but no heavy lifting at work , spoke about poss looking for another job if they cant accomadte her - fu 8 week re-eval

## 2024-10-10 ENCOUNTER — NON-APPOINTMENT (OUTPATIENT)
Age: 69
End: 2024-10-10

## 2024-10-10 ENCOUNTER — APPOINTMENT (OUTPATIENT)
Dept: ORTHOPEDIC SURGERY | Facility: CLINIC | Age: 69
End: 2024-10-10
Payer: OTHER MISCELLANEOUS

## 2024-10-10 DIAGNOSIS — M75.52 BURSITIS OF LEFT SHOULDER: ICD-10-CM

## 2024-10-10 DIAGNOSIS — S46.012D STRAIN OF MUSCLE(S) AND TENDON(S) OF THE ROTATOR CUFF OF LEFT SHOULDER, SUBSEQUENT ENCOUNTER: ICD-10-CM

## 2024-10-10 PROCEDURE — 20611 DRAIN/INJ JOINT/BURSA W/US: CPT | Mod: LT

## 2024-10-10 PROCEDURE — J3490M: CUSTOM

## 2024-10-10 PROCEDURE — 99214 OFFICE O/P EST MOD 30 MIN: CPT | Mod: 25

## 2024-12-05 ENCOUNTER — APPOINTMENT (OUTPATIENT)
Dept: ORTHOPEDIC SURGERY | Facility: CLINIC | Age: 69
End: 2024-12-05

## 2024-12-12 ENCOUNTER — NON-APPOINTMENT (OUTPATIENT)
Age: 69
End: 2024-12-12

## 2024-12-12 ENCOUNTER — APPOINTMENT (OUTPATIENT)
Dept: ORTHOPEDIC SURGERY | Facility: CLINIC | Age: 69
End: 2024-12-12
Payer: OTHER MISCELLANEOUS

## 2024-12-12 DIAGNOSIS — M75.52 BURSITIS OF LEFT SHOULDER: ICD-10-CM

## 2024-12-12 DIAGNOSIS — S46.012D STRAIN OF MUSCLE(S) AND TENDON(S) OF THE ROTATOR CUFF OF LEFT SHOULDER, SUBSEQUENT ENCOUNTER: ICD-10-CM

## 2024-12-12 PROCEDURE — 99213 OFFICE O/P EST LOW 20 MIN: CPT

## 2025-02-12 PROBLEM — M75.42 IMPINGEMENT SYNDROME OF LEFT SHOULDER: Status: ACTIVE | Noted: 2025-02-12

## 2025-02-13 ENCOUNTER — NON-APPOINTMENT (OUTPATIENT)
Age: 70
End: 2025-02-13

## 2025-02-13 ENCOUNTER — APPOINTMENT (OUTPATIENT)
Dept: ORTHOPEDIC SURGERY | Facility: CLINIC | Age: 70
End: 2025-02-13
Payer: OTHER MISCELLANEOUS

## 2025-02-13 DIAGNOSIS — S46.012D STRAIN OF MUSCLE(S) AND TENDON(S) OF THE ROTATOR CUFF OF LEFT SHOULDER, SUBSEQUENT ENCOUNTER: ICD-10-CM

## 2025-02-13 DIAGNOSIS — M75.52 BURSITIS OF LEFT SHOULDER: ICD-10-CM

## 2025-02-13 DIAGNOSIS — M75.42 IMPINGEMENT SYNDROME OF LEFT SHOULDER: ICD-10-CM

## 2025-02-13 PROCEDURE — 99214 OFFICE O/P EST MOD 30 MIN: CPT | Mod: 25

## 2025-02-13 PROCEDURE — J3490M: CUSTOM

## 2025-02-13 PROCEDURE — 20611 DRAIN/INJ JOINT/BURSA W/US: CPT | Mod: LT

## 2025-03-12 ENCOUNTER — DOCTOR'S OFFICE (OUTPATIENT)
Facility: LOCATION | Age: 70
Setting detail: OPHTHALMOLOGY
End: 2025-03-12
Payer: MEDICARE

## 2025-03-12 VITALS — HEIGHT: 55 IN

## 2025-03-12 DIAGNOSIS — H11.153: ICD-10-CM

## 2025-03-12 DIAGNOSIS — H43.811: ICD-10-CM

## 2025-03-12 DIAGNOSIS — H25.13: ICD-10-CM

## 2025-03-12 DIAGNOSIS — H53.002: ICD-10-CM

## 2025-03-12 PROCEDURE — 92004 COMPRE OPH EXAM NEW PT 1/>: CPT | Performed by: OPHTHALMOLOGY

## 2025-03-12 PROCEDURE — 92015 DETERMINE REFRACTIVE STATE: CPT | Performed by: OPHTHALMOLOGY

## 2025-03-12 PROCEDURE — 92250 FUNDUS PHOTOGRAPHY W/I&R: CPT | Performed by: OPHTHALMOLOGY

## 2025-03-12 ASSESSMENT — TONOMETRY
OD_IOP_MMHG: 16
OS_IOP_MMHG: 16

## 2025-03-12 ASSESSMENT — REFRACTION_AUTOREFRACTION
OD_AXIS: 178
OD_CYLINDER: +1.75
OS_CYLINDER: +1.00
OS_AXIS: 172
OS_SPHERE: +0.50
OD_SPHERE: +0.25

## 2025-03-12 ASSESSMENT — VISUAL ACUITY
OS_BCVA: 20/30-2
OD_BCVA: 20/30-2

## 2025-03-12 ASSESSMENT — REFRACTION_MANIFEST
OD_SPHERE: PLANO
OD_VA1: 20/20
OD_VA1: 20/30
OD_CYLINDER: +1.75
OD_CYLINDER: +1.25
OS_VA1: 20/25
OS_VA1: 20/150
OD_ADD: +3.00
OD_SPHERE: -6.75
OD_AXIS: 080
OS_CYLINDER: +1.75
OD_AXIS: 015
OS_AXIS: 090
OS_SPHERE: PLANO
OS_AXIS: 165
OS_CYLINDER: +1.25
OS_ADD: +3.00
OS_SPHERE: -6.75

## 2025-03-12 ASSESSMENT — REFRACTION_CURRENTRX
OS_CYLINDER: +0.50
OD_CYLINDER: +1.75
OD_SPHERE: -0.25
OD_OVR_VA: 20/
OS_VPRISM_DIRECTION: PROGS
OS_ADD: +2.00
OD_VPRISM_DIRECTION: PROGS
OS_OVR_VA: 20/
OS_SPHERE: +1.00
OD_ADD: +1.75
OD_AXIS: 166
OS_AXIS: 154

## 2025-03-12 ASSESSMENT — KERATOMETRY
OD_AXISANGLE_DEGREES: 180
OD_K1POWER_DIOPTERS: 42.75
OS_K2POWER_DIOPTERS: 43.25
OD_K2POWER_DIOPTERS: 43.75
OS_AXISANGLE_DEGREES: 164
OS_K1POWER_DIOPTERS: 42.75

## 2025-03-12 ASSESSMENT — CONFRONTATIONAL VISUAL FIELD TEST (CVF)
OS_FINDINGS: FULL
OD_FINDINGS: FULL

## 2025-03-28 ENCOUNTER — DOCTOR'S OFFICE (OUTPATIENT)
Facility: LOCATION | Age: 70
Setting detail: OPHTHALMOLOGY
End: 2025-03-28
Payer: MEDICARE

## 2025-03-28 DIAGNOSIS — H35.453: ICD-10-CM

## 2025-03-28 DIAGNOSIS — H33.311: ICD-10-CM

## 2025-03-28 DIAGNOSIS — H43.811: ICD-10-CM

## 2025-03-28 DIAGNOSIS — H25.13: ICD-10-CM

## 2025-03-28 DIAGNOSIS — H43.392: ICD-10-CM

## 2025-03-28 DIAGNOSIS — H35.413: ICD-10-CM

## 2025-03-28 PROBLEM — H11.153 PINGUECULA; BOTH EYES: Status: ACTIVE | Noted: 2025-03-12

## 2025-03-28 PROCEDURE — 92201 OPSCPY EXTND RTA DRAW UNI/BI: CPT | Performed by: OPHTHALMOLOGY

## 2025-03-28 PROCEDURE — 92014 COMPRE OPH EXAM EST PT 1/>: CPT | Mod: 25 | Performed by: OPHTHALMOLOGY

## 2025-03-28 PROCEDURE — 67145 PROPH RTA DTCHMNT PC: CPT | Mod: RT | Performed by: OPHTHALMOLOGY

## 2025-03-28 PROCEDURE — 92134 CPTRZ OPH DX IMG PST SGM RTA: CPT | Performed by: OPHTHALMOLOGY

## 2025-03-28 ASSESSMENT — KERATOMETRY
OS_K1POWER_DIOPTERS: 42.75
OD_AXISANGLE_DEGREES: 180
OD_K1POWER_DIOPTERS: 42.75
OD_K2POWER_DIOPTERS: 43.75
OS_K2POWER_DIOPTERS: 43.25
OS_AXISANGLE_DEGREES: 164

## 2025-03-28 ASSESSMENT — CONFRONTATIONAL VISUAL FIELD TEST (CVF)
OS_FINDINGS: FULL
OD_FINDINGS: FULL

## 2025-03-28 ASSESSMENT — REFRACTION_AUTOREFRACTION
OD_SPHERE: +0.25
OS_AXIS: 172
OS_SPHERE: +0.50
OD_CYLINDER: +1.75
OD_AXIS: 178
OS_CYLINDER: +1.00

## 2025-03-28 ASSESSMENT — VISUAL ACUITY
OD_BCVA: 20/25-2
OS_BCVA: 20/30+1

## 2025-04-15 PROBLEM — M19.012 ARTHRITIS OF LEFT GLENOHUMERAL JOINT: Status: ACTIVE | Noted: 2025-04-15

## 2025-04-17 ENCOUNTER — APPOINTMENT (OUTPATIENT)
Dept: ORTHOPEDIC SURGERY | Facility: CLINIC | Age: 70
End: 2025-04-17
Payer: OTHER MISCELLANEOUS

## 2025-04-17 DIAGNOSIS — S46.012D STRAIN OF MUSCLE(S) AND TENDON(S) OF THE ROTATOR CUFF OF LEFT SHOULDER, SUBSEQUENT ENCOUNTER: ICD-10-CM

## 2025-04-17 DIAGNOSIS — M19.012 PRIMARY OSTEOARTHRITIS, LEFT SHOULDER: ICD-10-CM

## 2025-04-17 PROCEDURE — 99214 OFFICE O/P EST MOD 30 MIN: CPT

## 2025-05-06 ENCOUNTER — DOCTOR'S OFFICE (OUTPATIENT)
Facility: LOCATION | Age: 70
Setting detail: OPHTHALMOLOGY
End: 2025-05-06
Payer: MEDICARE

## 2025-05-06 DIAGNOSIS — H43.392: ICD-10-CM

## 2025-05-06 DIAGNOSIS — H35.453: ICD-10-CM

## 2025-05-06 DIAGNOSIS — H33.311: ICD-10-CM

## 2025-05-06 DIAGNOSIS — H25.13: ICD-10-CM

## 2025-05-06 DIAGNOSIS — H43.811: ICD-10-CM

## 2025-05-06 DIAGNOSIS — H35.413: ICD-10-CM

## 2025-05-06 PROCEDURE — 99213 OFFICE O/P EST LOW 20 MIN: CPT | Mod: 25 | Performed by: OPHTHALMOLOGY

## 2025-05-06 PROCEDURE — 67228 TREATMENT X10SV RETINOPATHY: CPT | Mod: RT | Performed by: OPHTHALMOLOGY

## 2025-05-06 PROCEDURE — 92250 FUNDUS PHOTOGRAPHY W/I&R: CPT | Performed by: OPHTHALMOLOGY

## 2025-05-06 ASSESSMENT — REFRACTION_AUTOREFRACTION
OD_SPHERE: +0.25
OS_AXIS: 172
OD_CYLINDER: +1.75
OS_CYLINDER: +1.00
OS_SPHERE: +0.50
OD_AXIS: 178

## 2025-05-06 ASSESSMENT — KERATOMETRY
OS_K1POWER_DIOPTERS: 42.75
OD_K2POWER_DIOPTERS: 43.75
OD_AXISANGLE_DEGREES: 180
OD_K1POWER_DIOPTERS: 42.75
OS_AXISANGLE_DEGREES: 164
OS_K2POWER_DIOPTERS: 43.25

## 2025-05-06 ASSESSMENT — VISUAL ACUITY
OS_BCVA: 20/30-2
OD_BCVA: 20/30

## 2025-05-06 ASSESSMENT — CONFRONTATIONAL VISUAL FIELD TEST (CVF)
OS_FINDINGS: FULL
OD_FINDINGS: FULL

## 2025-06-12 ENCOUNTER — APPOINTMENT (OUTPATIENT)
Dept: ORTHOPEDIC SURGERY | Facility: CLINIC | Age: 70
End: 2025-06-12
Payer: OTHER MISCELLANEOUS

## 2025-06-12 ENCOUNTER — NON-APPOINTMENT (OUTPATIENT)
Age: 70
End: 2025-06-12

## 2025-06-12 PROCEDURE — 99213 OFFICE O/P EST LOW 20 MIN: CPT

## 2025-08-07 ENCOUNTER — APPOINTMENT (OUTPATIENT)
Dept: ORTHOPEDIC SURGERY | Facility: CLINIC | Age: 70
End: 2025-08-07
Payer: OTHER MISCELLANEOUS

## 2025-08-07 DIAGNOSIS — M19.012 PRIMARY OSTEOARTHRITIS, LEFT SHOULDER: ICD-10-CM

## 2025-08-07 DIAGNOSIS — M75.42 IMPINGEMENT SYNDROME OF LEFT SHOULDER: ICD-10-CM

## 2025-08-07 PROCEDURE — J3490M: CUSTOM

## 2025-08-07 PROCEDURE — 20611 DRAIN/INJ JOINT/BURSA W/US: CPT | Mod: LT

## 2025-08-07 PROCEDURE — 99214 OFFICE O/P EST MOD 30 MIN: CPT | Mod: 25
